# Patient Record
Sex: FEMALE | Race: WHITE | NOT HISPANIC OR LATINO | Employment: FULL TIME | URBAN - METROPOLITAN AREA
[De-identification: names, ages, dates, MRNs, and addresses within clinical notes are randomized per-mention and may not be internally consistent; named-entity substitution may affect disease eponyms.]

---

## 2018-01-19 ENCOUNTER — OFFICE VISIT (OUTPATIENT)
Dept: MEDICAL GROUP | Facility: MEDICAL CENTER | Age: 33
End: 2018-01-19
Payer: COMMERCIAL

## 2018-01-19 ENCOUNTER — APPOINTMENT (OUTPATIENT)
Dept: MEDICAL GROUP | Facility: MEDICAL CENTER | Age: 33
End: 2018-01-19
Payer: COMMERCIAL

## 2018-01-19 VITALS
HEART RATE: 78 BPM | SYSTOLIC BLOOD PRESSURE: 112 MMHG | OXYGEN SATURATION: 96 % | RESPIRATION RATE: 16 BRPM | HEIGHT: 67 IN | BODY MASS INDEX: 32.96 KG/M2 | WEIGHT: 210 LBS | DIASTOLIC BLOOD PRESSURE: 68 MMHG | TEMPERATURE: 98.2 F

## 2018-01-19 DIAGNOSIS — Z76.89 ENCOUNTER TO ESTABLISH CARE: ICD-10-CM

## 2018-01-19 DIAGNOSIS — G93.2 PSEUDOTUMOR CEREBRI: ICD-10-CM

## 2018-01-19 DIAGNOSIS — M25.561 ACUTE PAIN OF BOTH KNEES: ICD-10-CM

## 2018-01-19 DIAGNOSIS — E28.2 PCOS (POLYCYSTIC OVARIAN SYNDROME): ICD-10-CM

## 2018-01-19 DIAGNOSIS — M25.562 ACUTE PAIN OF BOTH KNEES: ICD-10-CM

## 2018-01-19 DIAGNOSIS — E66.9 OBESITY (BMI 30-39.9): ICD-10-CM

## 2018-01-19 PROCEDURE — 99204 OFFICE O/P NEW MOD 45 MIN: CPT | Performed by: PHYSICIAN ASSISTANT

## 2018-01-19 RX ORDER — MONTELUKAST SODIUM 10 MG/1
10 TABLET ORAL DAILY
COMMUNITY
End: 2018-08-09 | Stop reason: SDUPTHER

## 2018-01-19 RX ORDER — TOPIRAMATE 100 MG/1
100 TABLET, FILM COATED ORAL 2 TIMES DAILY
COMMUNITY
End: 2018-08-06

## 2018-01-19 RX ORDER — NAPROXEN 500 MG/1
500 TABLET ORAL 2 TIMES DAILY WITH MEALS
Qty: 60 TAB | Refills: 1 | Status: SHIPPED | OUTPATIENT
Start: 2018-01-19 | End: 2018-08-06

## 2018-01-19 ASSESSMENT — PATIENT HEALTH QUESTIONNAIRE - PHQ9: CLINICAL INTERPRETATION OF PHQ2 SCORE: 0

## 2018-01-19 NOTE — ASSESSMENT & PLAN NOTE
This is a 32-year-old female who is here today complaining of a 2 week history of bilateral knee pain. Pain is located on the upper medial aspect of the knees. Pain typically gets worse as the day progresses. Also at nighttime when she rolls over she will have sharp pain. Symptoms aren't continuous. She denies any previous injury. Has been taking ibuprofen 400 mg with good results.    Moved from [place] in November. Commutes from [place] to [place]. Works for the [employer] and [employer]. She states that usually her primary care will order x-rays. She is concerned that possibly there may be something worse because symptoms are not improving.

## 2018-01-19 NOTE — PROGRESS NOTES
Subjective:   Rupali Rosenthal is a 32 y.o. female here today for acute bilateral knee pain for 2 weeks. No trauma.    Acute pain of both knees  This is a 32-year-old female who is here today complaining of a 2 week history of bilateral knee pain. Pain is located on the upper medial aspect of the knees. Pain typically gets worse as the day progresses. Also at nighttime when she rolls over she will have sharp pain. Symptoms aren't continuous. She denies any previous injury. Has been taking ibuprofen 400 mg with good results.    Moved from [place] in November. Commutes from [place] to [place]. Works for the [employer]. She states that usually her primary care will order x-rays. She is concerned that possibly there may be something worse because symptoms are not improving.    PCOS (polycystic ovarian syndrome)  She states she has PCO OS. Per her record she takes metformin twice a day.    Pseudotumor cerebri  Diagnosed previously with this condition.       Current medicines (including changes today)  Current Outpatient Prescriptions   Medication Sig Dispense Refill   • metformin (GLUCOPHAGE) 1000 MG tablet Take 1,000 mg by mouth 2 times a day, with meals.     • topiramate (TOPAMAX) 100 MG Tab Take 100 mg by mouth 2 times a day.     • OMEPRAZOLE PO Take  by mouth.     • montelukast (SINGULAIR) 10 MG Tab Take 10 mg by mouth every day.     • FOLIC ACID PO Take  by mouth.     • Omega-3 Fatty Acids (FISH OIL PO) Take  by mouth.     • naproxen (NAPROSYN) 500 MG Tab Take 1 Tab by mouth 2 times a day, with meals. 60 Tab 1     No current facility-administered medications for this visit.      She  has no past medical history on file.    ROS   No chest pain, no shortness of breath, no abdominal pain and all other systems were reviewed and are negative.    Past medical history, social history and family history were updated and reviewed.     Objective:     Blood pressure 112/68, pulse 78, temperature 36.8 °C (98.2 °F),  "resp. rate 16, height 1.689 m (5' 6.5\"), weight 95.3 kg (210 lb), last menstrual period 01/04/2018, SpO2 96 %, not currently breastfeeding. Body mass index is 33.39 kg/m².   Physical Exam:  Constitutional: Alert, no distress.  Skin: Warm, dry, good turgor, no rashes in visible areas.  Eye: Equal, round and reactive, conjunctiva clear, lids normal.  ENMT: Lips without lesions, good dentition, oropharynx clear.  Neck: Trachea midline, no masses.   Lymph: No cervical or supraclavicular lymphadenopathy  Respiratory: Unlabored respiratory effort, lungs appear clear, no wheezes.  Cardiovascular: Normal S1, S2, no murmur, no edema.  Musculoskeletal: Bilateral knees appear symmetrical. Range of motion is good. Muscle strength 5 out of 5. Unable to elicit tenderness. Gait appears normal.  Psych: Alert and oriented x3, normal affect and mood.        Assessment and Plan:   The following treatment plan was discussed    1. Acute pain of both knees  Acute, new onset condition. Likely strain. Advised x-ray would not be recommended currently as there is no acute injury. Offered referral to PT patient unsure she will go if not covered by her insurance. Provided prescription for naproxen 500 mg twice a day with food. Take for at least 10 days. If symptoms not improving may contact me as well to be referred to orthopedics.  - REFERRAL TO PHYSICAL THERAPY Reason for Therapy: Eval/Treat/Report  - naproxen (NAPROSYN) 500 MG Tab; Take 1 Tab by mouth 2 times a day, with meals.  Dispense: 60 Tab; Refill: 1    2. PCOS (polycystic ovarian syndrome)  Chronic condition. Continue metformin use. Follow-up if needed.    3. Pseudotumor cerebri  Chronic condition and stable.    4. Obesity (BMI 30-39.9)  Will monitor.  - Patient identified as having weight management issue.  Appropriate orders and counseling given.    5. Encounter to establish care      Followup: No Follow-up on file.    Please note that this dictation was created using voice " recognition software. I have made every reasonable attempt to correct obvious errors, but I expect that there are errors of grammar and possibly content that I did not discover before finalizing the note.

## 2018-05-02 ENCOUNTER — APPOINTMENT (OUTPATIENT)
Dept: MEDICAL GROUP | Facility: MEDICAL CENTER | Age: 33
End: 2018-05-02
Payer: COMMERCIAL

## 2018-08-06 ENCOUNTER — OFFICE VISIT (OUTPATIENT)
Dept: URGENT CARE | Facility: PHYSICIAN GROUP | Age: 33
End: 2018-08-06

## 2018-08-06 VITALS
TEMPERATURE: 97.4 F | DIASTOLIC BLOOD PRESSURE: 68 MMHG | RESPIRATION RATE: 16 BRPM | SYSTOLIC BLOOD PRESSURE: 108 MMHG | OXYGEN SATURATION: 97 % | WEIGHT: 230 LBS | HEIGHT: 67 IN | HEART RATE: 100 BPM | BODY MASS INDEX: 36.1 KG/M2

## 2018-08-06 DIAGNOSIS — M54.42 ACUTE LEFT-SIDED LOW BACK PAIN WITH LEFT-SIDED SCIATICA: ICD-10-CM

## 2018-08-06 LAB
APPEARANCE UR: NORMAL
BILIRUB UR STRIP-MCNC: NEGATIVE MG/DL
COLOR UR AUTO: YELLOW
GLUCOSE UR STRIP.AUTO-MCNC: NEGATIVE MG/DL
INT CON NEG: NORMAL
INT CON POS: NORMAL
KETONES UR STRIP.AUTO-MCNC: NEGATIVE MG/DL
LEUKOCYTE ESTERASE UR QL STRIP.AUTO: NORMAL
NITRITE UR QL STRIP.AUTO: NEGATIVE
PH UR STRIP.AUTO: 6 [PH] (ref 5–8)
POC URINE PREGNANCY TEST: NEGATIVE
PROT UR QL STRIP: NORMAL MG/DL
RBC UR QL AUTO: NEGATIVE
SP GR UR STRIP.AUTO: 1.02
UROBILINOGEN UR STRIP-MCNC: NEGATIVE MG/DL

## 2018-08-06 PROCEDURE — 81025 URINE PREGNANCY TEST: CPT | Performed by: NURSE PRACTITIONER

## 2018-08-06 PROCEDURE — 99213 OFFICE O/P EST LOW 20 MIN: CPT | Performed by: NURSE PRACTITIONER

## 2018-08-06 PROCEDURE — 81002 URINALYSIS NONAUTO W/O SCOPE: CPT | Performed by: NURSE PRACTITIONER

## 2018-08-06 RX ORDER — CYCLOBENZAPRINE HCL 5 MG
5-10 TABLET ORAL 3 TIMES DAILY PRN
Qty: 20 TAB | Refills: 0 | Status: SHIPPED | OUTPATIENT
Start: 2018-08-06 | End: 2018-10-02 | Stop reason: SDUPTHER

## 2018-08-06 ASSESSMENT — PAIN SCALES - GENERAL: PAINLEVEL: 9=SEVERE PAIN

## 2018-08-07 NOTE — PROGRESS NOTES
Chief Complaint   Patient presents with   • Back Pain     lower back, onset this morning       HISTORY OF PRESENT ILLNESS: Patient is a 32 y.o. female who presents today due to complaint of low back pain. States when she awoke and turned to get out of bed this morning, she noticed pain to her low back, bilateral, mostly left sided. Pain is radiating to left lower extremity and has intermittent tingling sensation. Denies fever, chills, malaise, hematuria, saddle anesthesia, numbness, unilateral weakness, or loss of bowel or bladder. Standing and walking do not evoke pain but moving from sitting to standing is painful. She has taken ibuprofen for pain relief. She denies any significant injury or trauma. She denies previous history of the same.      Patient Active Problem List    Diagnosis Date Noted   • PCOS (polycystic ovarian syndrome) 01/19/2018   • Pseudotumor cerebri 01/19/2018   • Acute pain of both knees 01/19/2018   • Obesity (BMI 30-39.9) 01/19/2018       Allergies:Fexofenadine    Current Outpatient Prescriptions Ordered in [facility]   Medication Sig Dispense Refill   • metformin (GLUCOPHAGE) 1000 MG tablet Take 1,000 mg by mouth 2 times a day, with meals.     • OMEPRAZOLE PO Take  by mouth.     • montelukast (SINGULAIR) 10 MG Tab Take 10 mg by mouth every day.     • FOLIC ACID PO Take  by mouth.     • Omega-3 Fatty Acids (FISH OIL PO) Take  by mouth.       No current Epic-ordered facility-administered medications on file.        History reviewed. No pertinent past medical history.    Social History   Substance Use Topics   • Smoking status: Light Tobacco Smoker   • Smokeless tobacco: Never Used      Comment: once every few months   • Alcohol use Yes      Comment: maybe once monthly       Family Status   Relation Status   • Mo Alive   • Fa Alive   History reviewed. No pertinent family history.    ROS:  Review of Systems   Constitutional: Negative for fever, chills, weight loss, malaise, and fatigue.   HENT:  "Negative for ear pain, nosebleeds, congestion, sore throat and neck pain.    Eyes: Negative for vision changes.   Neuro: Negative for headache, weakness, seizure, LOC.   Cardiovascular: Negative for chest pain, palpitations, orthopnea and leg swelling.   Respiratory: Negative for cough, sputum production, shortness of breath and wheezing.   Gastrointestinal: Negative for abdominal pain, nausea, vomiting or diarrhea.   Genitourinary: Negative for dysuria, urgency and frequency.  Musculoskeletal: Positive for low back pain, radiating to LLE, intermittent tingling. Negative for falls, neck pain, joint pain, myalgias.   Skin: Negative for rash, diaphoresis.     Exam:  Blood pressure 108/68, pulse 100, temperature 36.3 °C (97.4 °F), resp. rate 16, height 1.689 m (5' 6.5\"), weight 104.3 kg (230 lb), last menstrual period 07/10/2018, SpO2 97 %, not currently breastfeeding.  General: well-nourished, well-developed female in NAD  Head: normocephalic, atraumatic  Eyes: PERRLA, no conjunctival injection, acuity grossly intact, lids normal.  Ears: normal shape and symmetry, no tenderness, no discharge. External canals are without any significant edema or erythema. Tympanic membranes are without any inflammation, no effusion. Gross auditory acuity is intact.  Nose: symmetrical without tenderness, no discharge.  Mouth/Throat: reasonable hygiene, no erythema, exudates or tonsillar enlargement.  Neck: no masses, range of motion within normal limits, no tracheal deviation. No obvious thyroid enlargement.   Lymph: no cervical adenopathy. No supraclavicular adenopathy.   Neuro: alert and oriented. Cranial nerves 1-12 grossly intact. No sensory deficit.   Cardiovascular: regular rate and rhythm. No edema.  Pulmonary: no distress. Chest is symmetrical with respiration, no wheezes, crackles, or rhonchi.   Musculoskeletal: lumbar back: pt exhibits decreased range of motion with ROM in all directions, tenderness to the lubmar and left " paralumbar spinal region. Pt exhibits no swelling, edema or deformity. Patient has normal reflexes, patellar reflexes are 2+ on the right side and 2+ on the left side. Patient exhibits normal muscle tone. Positive right sided straight leg raise. Gait antalgic. No clubbing.   Skin: warm, dry, intact, no clubbing, no cyanosis, no rashes.   Psych: appropriate mood, affect, judgement.         Assessment/Plan:  1. Acute left-sided low back pain with left-sided sciatica  POCT Urinalysis    POCT Pregnancy    cyclobenzaprine (FLEXERIL) 5 MG tablet         Lab Results   Component Value Date/Time    POCCOLOR Yellow 08/06/2018 06:13 PM    POCAPPEAR Cloudy 08/06/2018 06:13 PM    POCLEUKEST Trace 08/06/2018 06:13 PM    POCNITRITE Negative 08/06/2018 06:13 PM    POCUROBILIGE Negative 08/06/2018 06:13 PM    POCPROTEIN Trace 08/06/2018 06:13 PM    POCURPH 6.0 08/06/2018 06:13 PM    POCBLOOD Negative 08/06/2018 06:13 PM    POCSPGRV 1.025 08/06/2018 06:13 PM    POCKETONES Negative 08/06/2018 06:13 PM    POCBILIRUBIN Negative 08/06/2018 06:13 PM    POCGLUCUA Negative 08/06/2018 06:13 PM       POC preg negative      Suspect musculoskeletal etiology, OTC NSAIDS or tylenol for pain relief. Flexeril for breakthrough pain/spasms, sedative effects of medication discussed with patient.   Supportive care, differential diagnoses, and indications for immediate follow-up discussed with patient.   Pathogenesis of diagnosis discussed including typical length and natural progression.   Instructed to return to clinic or nearest emergency department for any change in condition, further concerns, or worsening of symptoms.  Patient states understanding of the plan of care and discharge instructions.  Instructed to make an appointment, for follow up, with their primary care provider.        Please note that this dictation was created using voice recognition software. I have made every reasonable attempt to correct obvious errors, but I expect that there  are errors of grammar and possibly content that I did not discover before finalizing the note.      ARLET Zavala.

## 2018-08-09 ENCOUNTER — OFFICE VISIT (OUTPATIENT)
Dept: MEDICAL GROUP | Facility: MEDICAL CENTER | Age: 33
End: 2018-08-09
Payer: COMMERCIAL

## 2018-08-09 VITALS
WEIGHT: 226 LBS | DIASTOLIC BLOOD PRESSURE: 82 MMHG | OXYGEN SATURATION: 98 % | HEART RATE: 99 BPM | TEMPERATURE: 97.9 F | SYSTOLIC BLOOD PRESSURE: 110 MMHG | HEIGHT: 66 IN | BODY MASS INDEX: 36.32 KG/M2

## 2018-08-09 DIAGNOSIS — G93.2 PSEUDOTUMOR CEREBRI: ICD-10-CM

## 2018-08-09 DIAGNOSIS — E28.2 PCOS (POLYCYSTIC OVARIAN SYNDROME): ICD-10-CM

## 2018-08-09 DIAGNOSIS — J30.1 SEASONAL ALLERGIC RHINITIS DUE TO POLLEN: ICD-10-CM

## 2018-08-09 DIAGNOSIS — E66.9 OBESITY (BMI 30-39.9): ICD-10-CM

## 2018-08-09 DIAGNOSIS — R63.4 WEIGHT LOSS: ICD-10-CM

## 2018-08-09 DIAGNOSIS — K21.9 GASTROESOPHAGEAL REFLUX DISEASE, ESOPHAGITIS PRESENCE NOT SPECIFIED: ICD-10-CM

## 2018-08-09 PROBLEM — M25.562 ACUTE PAIN OF BOTH KNEES: Status: RESOLVED | Noted: 2018-01-19 | Resolved: 2018-08-09

## 2018-08-09 PROBLEM — M25.561 ACUTE PAIN OF BOTH KNEES: Status: RESOLVED | Noted: 2018-01-19 | Resolved: 2018-08-09

## 2018-08-09 PROCEDURE — 99214 OFFICE O/P EST MOD 30 MIN: CPT | Performed by: PHYSICIAN ASSISTANT

## 2018-08-09 RX ORDER — TOPIRAMATE SPINKLE 15 MG/1
15 CAPSULE ORAL 2 TIMES DAILY
COMMUNITY
End: 2018-08-09

## 2018-08-09 RX ORDER — TOPIRAMATE 50 MG/1
50 TABLET, FILM COATED ORAL DAILY
Qty: 30 TAB | Refills: 0 | Status: SHIPPED | OUTPATIENT
Start: 2018-08-09 | End: 2018-09-10 | Stop reason: SDUPTHER

## 2018-08-09 RX ORDER — MONTELUKAST SODIUM 10 MG/1
10 TABLET ORAL DAILY
Qty: 30 TAB | Refills: 11 | Status: SHIPPED | OUTPATIENT
Start: 2018-08-09

## 2018-08-09 RX ORDER — PHENTERMINE HYDROCHLORIDE 30 MG/1
30 CAPSULE ORAL EVERY MORNING
Qty: 30 CAP | Refills: 0 | Status: SHIPPED | OUTPATIENT
Start: 2018-08-09 | End: 2018-08-31 | Stop reason: SDUPTHER

## 2018-08-09 NOTE — ASSESSMENT & PLAN NOTE
Has chronic seasonal allergies. Symptoms may worse by pollen. Symptoms worst in [place]. She doesn't expect him to be bad in [place] but will take cetirizine as well as Singulair. Requesting a refill for Singulair.

## 2018-08-09 NOTE — PROGRESS NOTES
Subjective:   Rupali Rosenthal is a 32 y.o. female here today for obesity and pseudotumor cerebri.    Obesity (BMI 30-39.9)  This is a 32-year-old female who is here today to discuss her chronic history of obesity and other concerns. She has gained weight since moving from [place] to [place]. She moved up here to work at the [employer]. Was recently terminated. She was terminated because there was a disgruntled  who complained that she was driving erratically on the roads. She does understand the complaint but will accept the consequences and move on. She is hopeful she will find a better job. In the meantime she is concerned about her weight gain. She has gained 16 pounds since I saw her in January. In the past she was on phentermine and Topamax, Topamax was for her history of pseudotumor cerebri. She would like to get back on those medications. Denies any side effects while taking phentermine.    Pseudotumor cerebri  In [place] she was seen by neurology. She has chronic pseudotumor cerebri. At one point she was on 200 mg of Topamax. Medication help control her symptoms. Also had a dual effect of weight loss. Currently she would like to wait to see about being referred to neurology.    PCOS (polycystic ovarian syndrome)  Has chronic after meals OS. Followed by gynecology. Is on metformin thousand milligrams twice a day. Currently she is not trying to get pregnant.    Seasonal allergic rhinitis due to pollen  Has chronic seasonal allergies. Symptoms may worse by pollen. Symptoms worst in [place]. She doesn't expect him to be bad in [place] but will take cetirizine as well as Singulair. Requesting a refill for Singulair.    GERD (gastroesophageal reflux disease)  Has chronic GERD symptoms. Takes omeprazole with good results. Requesting a refill.       Current medicines (including changes today)  Current Outpatient Prescriptions   Medication Sig Dispense Refill   • topiramate (TOPAMAX) 50 MG tablet  "Take 1 Tab by mouth every day. 30 Tab 0   • phentermine 30 MG capsule Take 1 Cap by mouth every morning for 30 days. 30 Cap 0   • metformin (GLUCOPHAGE) 1000 MG tablet Take 1 Tab by mouth 2 times a day, with meals. 60 Tab 11   • montelukast (SINGULAIR) 10 MG Tab Take 1 Tab by mouth every day. 30 Tab 11   • cyclobenzaprine (FLEXERIL) 5 MG tablet Take 1-2 Tabs by mouth 3 times a day as needed for Moderate Pain, Severe Pain or Muscle Spasms. 20 Tab 0   • OMEPRAZOLE PO Take  by mouth.     • FOLIC ACID PO Take  by mouth.     • Omega-3 Fatty Acids (FISH OIL PO) Take  by mouth.       No current facility-administered medications for this visit.      She  has no past medical history on file.    Social History and Family History were reviewed and updated.    ROS   No chest pain, no shortness of breath, no abdominal pain and all other systems were reviewed and are negative.       Objective:     Blood pressure 110/82, pulse 99, temperature 36.6 °C (97.9 °F), height 1.676 m (5' 6\"), weight 102.5 kg (226 lb), last menstrual period 07/10/2018, SpO2 98 %. Body mass index is 36.48 kg/m².   Physical Exam:  Constitutional: Alert, no distress.  Skin: Warm, dry, good turgor, no rashes in visible areas.  Eye: Equal, round and reactive, conjunctiva clear, lids normal.  ENMT: Lips without lesions, good dentition, oropharynx clear.  Neck: Trachea midline, no masses.   Lymph: No cervical or supraclavicular lymphadenopathy  Respiratory: Unlabored respiratory effort, lungs clear to auscultation, no wheezes, no ronchi.  Cardiovascular: Normal S1, S2, no murmur, no edema.  Abdomen: Soft, non-tender, no masses.  Psych: Alert and oriented x3, normal affect and mood.        Assessment and Plan:   The following treatment plan was discussed    1. Obesity (BMI 30-39.9)  Chronic condition.  reviewed. Medication is appropriate for patient. Controlled substance agreement was signed. Prescribed phentermine 30 mg capsules. Discussed side effects. Follow " up in one month for refills. Must see me for refills.  - phentermine 30 MG capsule; Take 1 Cap by mouth every morning for 30 days.  Dispense: 30 Cap; Refill: 0  - Patient identified as having weight management issue.  Appropriate orders and counseling given.  - Controlled Substance Treatment Agreement    2. Pseudotumor cerebri  Chronic condition. Stable. Started Topamax 25 mg for the first 5 days. Increase to 50 mg. May increase in the future.  - topiramate (TOPAMAX) 50 MG tablet; Take 1 Tab by mouth every day.  Dispense: 30 Tab; Refill: 0    3. PCOS (polycystic ovarian syndrome)  Chronic condition. Stable. Prescribed renewal for metformin thousand milligrams twice a day.  - metformin (GLUCOPHAGE) 1000 MG tablet; Take 1 Tab by mouth 2 times a day, with meals.  Dispense: 60 Tab; Refill: 11    4. Seasonal allergic rhinitis due to pollen  Chronic condition. Discussed purchasing over-the-counter cetirizine. Renew Singulair 10 mg tablets. She may await to see if symptoms require Singulair.  - montelukast (SINGULAIR) 10 MG Tab; Take 1 Tab by mouth every day.  Dispense: 30 Tab; Refill: 11    5. Gastroesophageal reflux disease, esophagitis presence not specified  Chronic condition. Stable. Follow-up we will discuss more about her symptoms. Continue omeprazole.      Followup: Return in about 4 weeks (around 9/6/2018), or if symptoms worsen or fail to improve.    Please note that this dictation was created using voice recognition software. I have made every reasonable attempt to correct obvious errors, but I expect that there are errors of grammar and possibly content that I did not discover before finalizing the note.

## 2018-08-09 NOTE — ASSESSMENT & PLAN NOTE
Has chronic after meals OS. Followed by gynecology. Is on metformin thousand milligrams twice a day. Currently she is not trying to get pregnant.

## 2018-08-09 NOTE — ASSESSMENT & PLAN NOTE
In [place] she was seen by neurology. She has chronic pseudotumor cerebri. At one point she was on 200 mg of Topamax. Medication help control her symptoms. Also had a dual effect of weight loss. Currently she would like to wait to see about being referred to neurology.

## 2018-08-09 NOTE — ASSESSMENT & PLAN NOTE
This is a 32-year-old female who is here today to discuss her chronic history of obesity and other concerns. She has gained weight since moving from [place] to [place]. She moved up here to work at the [employer]. Was recently terminated. She was terminated because there was a disgruntled  who complained that she was driving erratically on the roads. She does understand the complaint but will accept the consequences and move on. She is hopeful she will find a better job. In the meantime she is concerned about her weight gain. She has gained 16 pounds since I saw her in January. In the past she was on phentermine and Topamax, Topamax was for her history of pseudotumor cerebri. She would like to get back on those medications. Denies any side effects while taking phentermine.

## 2018-08-31 ENCOUNTER — OFFICE VISIT (OUTPATIENT)
Dept: MEDICAL GROUP | Facility: MEDICAL CENTER | Age: 33
End: 2018-08-31
Payer: COMMERCIAL

## 2018-08-31 VITALS
BODY MASS INDEX: 36.14 KG/M2 | TEMPERATURE: 97.4 F | HEART RATE: 97 BPM | HEIGHT: 66 IN | SYSTOLIC BLOOD PRESSURE: 112 MMHG | DIASTOLIC BLOOD PRESSURE: 82 MMHG | WEIGHT: 224.87 LBS | OXYGEN SATURATION: 98 %

## 2018-08-31 DIAGNOSIS — E66.9 OBESITY (BMI 30-39.9): ICD-10-CM

## 2018-08-31 PROCEDURE — 99214 OFFICE O/P EST MOD 30 MIN: CPT | Performed by: PHYSICIAN ASSISTANT

## 2018-08-31 RX ORDER — PHENTERMINE HYDROCHLORIDE 30 MG/1
30 CAPSULE ORAL EVERY MORNING
Qty: 30 CAP | Refills: 0 | Status: SHIPPED | OUTPATIENT
Start: 2018-08-31 | End: 2018-09-30

## 2018-08-31 NOTE — ASSESSMENT & PLAN NOTE
This is a 32-year-old female who is here today to discuss her status obesity. Is currently on her menses. Typically will gain weight when she is menstruating. Has lost a couple pounds. She is trying exercise and eat healthier. Denies any side effects. She is leaving and returning to [place]. Very disappointed with the [employer] in [place]. 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 She will be working shortly. All of her other medications will also get transferred over from the pharmacy.

## 2018-08-31 NOTE — PROGRESS NOTES
"Subjective:   Rupali Rosenthal is a 32 y.o. female here today for obesity.    Obesity (BMI 30-39.9)  This is a 32-year-old female who is here today to discuss her status obesity. Is currently on her menses. Typically will gain weight when she is menstruating. Has lost a couple pounds. She is trying exercise and eat healthier. Denies any side effects. She is leaving and returning to [place]. Very disappointed with the [employer] in [place]. 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 All of her other medications will also get transferred over from the pharmacy.       Current medicines (including changes today)  Current Outpatient Prescriptions   Medication Sig Dispense Refill   • phentermine 30 MG capsule Take 1 Cap by mouth every morning for 30 days. 30 Cap 0   • topiramate (TOPAMAX) 50 MG tablet Take 1 Tab by mouth every day. 30 Tab 0   • metformin (GLUCOPHAGE) 1000 MG tablet Take 1 Tab by mouth 2 times a day, with meals. 60 Tab 11   • montelukast (SINGULAIR) 10 MG Tab Take 1 Tab by mouth every day. 30 Tab 11   • OMEPRAZOLE PO Take  by mouth.     • FOLIC ACID PO Take  by mouth.     • Omega-3 Fatty Acids (FISH OIL PO) Take  by mouth.     • cyclobenzaprine (FLEXERIL) 5 MG tablet Take 1-2 Tabs by mouth 3 times a day as needed for Moderate Pain, Severe Pain or Muscle Spasms. 20 Tab 0     No current facility-administered medications for this visit.      She  has no past medical history on file.    Social History and Family History were reviewed and updated.    ROS   No chest pain, no shortness of breath, no abdominal pain and all other systems were reviewed and are negative.       Objective:     Blood pressure 112/82, pulse 97, temperature 36.3 °C (97.4 °F), height 1.676 m (5' 6\"), weight 102 kg (224 lb 13.9 oz), SpO2 98 %. Body mass index is 36.29 kg/m².   Physical Exam:  Constitutional: Alert, no distress.  Skin: Warm, dry, " good turgor, no rashes in visible areas.  Eye: Equal, round and reactive, conjunctiva clear, lids normal.  ENMT: Lips without lesions, good dentition, oropharynx clear.  Neck: Trachea midline, no masses.   Lymph: No cervical or supraclavicular lymphadenopathy  Respiratory: Unlabored respiratory effort, lungs appear clear, no wheezes.  Cardiovascular: Regular rate and rhythm, no edema.  Psych: Alert and oriented x3, normal affect and mood.        Assessment and Plan:   The following treatment plan was discussed    1. Obesity (BMI 30-39.9)  Chronic condition.  reviewed. Medications appropriate for patient. Prescribed phentermine 30 mg capsules daily. Unfortunately forgot to increase the dose to the 37.5 mg. Advised follow-up with a new primary care and Amor Flores to continue the course. She is asymptomatic all taking the medication. Did not lose enough weight as I would have hoped. I realize though and she is currently in the process of moving which could make it difficult to continue exercising routinely. Advised contact me in all status with any concerns.  - phentermine 30 MG capsule; Take 1 Cap by mouth every morning for 30 days.  Dispense: 30 Cap; Refill: 0      Followup: Return if symptoms worsen or fail to improve, for follow-up in [facility] with new PCP.    Please note that this dictation was created using voice recognition software. I have made every reasonable attempt to correct obvious errors, but I expect that there are errors of grammar and possibly content that I did not discover before finalizing the note.

## 2018-09-10 DIAGNOSIS — G93.2 PSEUDOTUMOR CEREBRI: ICD-10-CM

## 2018-09-10 RX ORDER — TOPIRAMATE 50 MG/1
TABLET, FILM COATED ORAL
Qty: 30 TAB | Refills: 5 | Status: SHIPPED | OUTPATIENT
Start: 2018-09-10

## 2018-09-11 NOTE — TELEPHONE ENCOUNTER
Was the patient seen in the last year in this department? Yes, 08/31/2018    Does patient have an active prescription for medications requested? No     Received Request Via: Pharmacy

## 2018-10-02 ENCOUNTER — TELEPHONE (OUTPATIENT)
Dept: MEDICAL GROUP | Facility: MEDICAL CENTER | Age: 33
End: 2018-10-02

## 2018-10-02 DIAGNOSIS — M54.42 ACUTE LEFT-SIDED LOW BACK PAIN WITH LEFT-SIDED SCIATICA: ICD-10-CM

## 2018-10-02 RX ORDER — CYCLOBENZAPRINE HCL 5 MG
5-10 TABLET ORAL 3 TIMES DAILY PRN
Qty: 20 TAB | Refills: 0 | Status: CANCELLED | OUTPATIENT
Start: 2018-10-02

## 2018-10-02 RX ORDER — CYCLOBENZAPRINE HCL 5 MG
5-10 TABLET ORAL 3 TIMES DAILY PRN
Qty: 20 TAB | Refills: 0 | Status: SHIPPED | OUTPATIENT
Start: 2018-10-02

## 2018-10-02 NOTE — TELEPHONE ENCOUNTER
VOICEMAIL  1. Caller Name: Rupali Rosenthal                        Call Back Number: [phone] (home)      2. Message: Patient called and left a message wanting an RX refill. I have called patient back and have left her a message to call us back to see what we can do for her.     3. Patient approves office to leave a detailed voicemail/MyChart message: yes